# Patient Record
Sex: MALE | Race: WHITE | Employment: OTHER | URBAN - METROPOLITAN AREA
[De-identification: names, ages, dates, MRNs, and addresses within clinical notes are randomized per-mention and may not be internally consistent; named-entity substitution may affect disease eponyms.]

---

## 2019-09-13 ENCOUNTER — HOSPITAL ENCOUNTER (EMERGENCY)
Age: 34
Discharge: HOME | End: 2019-09-13
Payer: OTHER GOVERNMENT

## 2019-09-13 ENCOUNTER — APPOINTMENT (OUTPATIENT)
Dept: GENERAL RADIOLOGY | Age: 34
End: 2019-09-13
Payer: OTHER GOVERNMENT

## 2019-09-13 VITALS
OXYGEN SATURATION: 97 % | HEART RATE: 87 BPM | HEIGHT: 74 IN | DIASTOLIC BLOOD PRESSURE: 92 MMHG | WEIGHT: 225 LBS | BODY MASS INDEX: 28.88 KG/M2 | SYSTOLIC BLOOD PRESSURE: 139 MMHG | TEMPERATURE: 98.4 F | RESPIRATION RATE: 18 BRPM

## 2019-09-13 DIAGNOSIS — L03.011 CELLULITIS OF FINGER OF RIGHT HAND: Primary | ICD-10-CM

## 2019-09-13 PROCEDURE — 73140 X-RAY EXAM OF FINGER(S): CPT

## 2019-09-13 PROCEDURE — 99283 EMERGENCY DEPT VISIT LOW MDM: CPT

## 2019-09-13 RX ORDER — SULFAMETHOXAZOLE AND TRIMETHOPRIM 800; 160 MG/1; MG/1
1 TABLET ORAL 2 TIMES DAILY
Qty: 20 TABLET | Refills: 0 | Status: SHIPPED | OUTPATIENT
Start: 2019-09-13 | End: 2019-09-23

## 2019-09-13 RX ORDER — CEPHALEXIN 500 MG/1
500 CAPSULE ORAL 4 TIMES DAILY
Qty: 40 CAPSULE | Refills: 0 | Status: SHIPPED | OUTPATIENT
Start: 2019-09-13 | End: 2019-09-23

## 2019-09-17 NOTE — ED PROVIDER NOTES
This patient was not seen and evaluated by the attending physician. CHIEF COMPLAINT  Joint Swelling (patient reports \"dislocating\" his right hand ring finger x 4 weeks ago. 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 swelling in the right hand ring finger started swelling x 2 days ago and pt noticed a rash like streak going up right arm. says he also has some swelling in right arm pit ) and Rash      HISTORY OF PRESENT ILLNESS  Yisel Corey is a 34 y.o. male who presents to the ED for evaluation of right ring finger pain and a rash. Patient states that about a month ago he dislocated the pip joint on right ring finger. He states that it improved however he then developed some redness and swelling to the joint and now has a red line going from hand up right arm. No fevers. Patient states he is concerned about infection. There was no break in skin integrity with his dislocation. Here for further evaluation. No other complaints, modifying factors or associated symptoms. Nursing notes reviewed. Past Medical History:   Diagnosis Date    Asthma      No past surgical history on file. No family history on file.   Social History     Socioeconomic History    Marital status:      Spouse name: Not on file    Number of children: Not on file    Years of education: Not on file    Highest education level: Not on file   Occupational History    Not on file   Social Needs    Financial resource strain: Not on file    Food insecurity:     Worry: Not on file     Inability: Not on file    Transportation needs:     Medical: Not on file     Non-medical: Not on file   Tobacco Use    Smoking status: Former Smoker    Smokeless tobacco: Never Used   Substance and Sexual Activity    Alcohol use: No    Drug use: Not on file    Sexual activity: Not on file   Lifestyle    Physical activity:     Days per week: Not on file     Minutes per session: Not on file    Stress: Not on file   Relationships    Social

## 2020-02-06 ENCOUNTER — HOSPITAL ENCOUNTER (EMERGENCY)
Age: 35
Discharge: HOME | End: 2020-02-06
Payer: OTHER GOVERNMENT

## 2020-02-06 VITALS
DIASTOLIC BLOOD PRESSURE: 88 MMHG | HEIGHT: 74 IN | TEMPERATURE: 97.6 F | WEIGHT: 230 LBS | BODY MASS INDEX: 29.52 KG/M2 | OXYGEN SATURATION: 98 % | HEART RATE: 74 BPM | RESPIRATION RATE: 16 BRPM | SYSTOLIC BLOOD PRESSURE: 130 MMHG

## 2020-02-06 PROCEDURE — 65205 REMOVE FOREIGN BODY FROM EYE: CPT

## 2020-02-06 PROCEDURE — 99282 EMERGENCY DEPT VISIT SF MDM: CPT

## 2020-02-06 RX ORDER — PROPARACAINE HYDROCHLORIDE 5 MG/ML
SOLUTION/ DROPS OPHTHALMIC
Status: DISCONTINUED
Start: 2020-02-06 | End: 2020-02-06 | Stop reason: HOSPADM

## 2020-02-06 RX ORDER — SULFACETAMIDE SODIUM 100 MG/ML
SOLUTION/ DROPS OPHTHALMIC
Status: DISCONTINUED
Start: 2020-02-06 | End: 2020-02-06 | Stop reason: HOSPADM

## 2020-02-06 RX ORDER — IBUPROFEN 600 MG/1
600 TABLET ORAL EVERY 6 HOURS PRN
Qty: 120 TABLET | Refills: 0 | Status: SHIPPED | OUTPATIENT
Start: 2020-02-06

## 2020-02-06 RX ORDER — SULFACETAMIDE SODIUM 100 MG/ML
2 SOLUTION/ DROPS OPHTHALMIC
Qty: 1 BOTTLE | Refills: 0 | Status: SHIPPED | OUTPATIENT
Start: 2020-02-06 | End: 2020-02-11

## 2020-02-06 RX ORDER — BALANCED SALT SOLUTION ENRICHED WITH BICARBONATE, DEXTROSE, AND GLUTATHIONE
KIT INTRAOCULAR
Status: DISCONTINUED
Start: 2020-02-06 | End: 2020-02-06 | Stop reason: HOSPADM

## 2020-02-06 ASSESSMENT — PAIN SCALES - GENERAL: PAINLEVEL_OUTOF10: 5

## 2020-02-06 ASSESSMENT — ENCOUNTER SYMPTOMS
COUGH: 0
VOMITING: 0
EYE DISCHARGE: 0
CONSTIPATION: 0
EYE PAIN: 1
COLOR CHANGE: 0
EYE REDNESS: 1
NAUSEA: 0
DIARRHEA: 0
BACK PAIN: 0
EYE ITCHING: 0
SHORTNESS OF BREATH: 0
ABDOMINAL PAIN: 0
PHOTOPHOBIA: 0
RESPIRATORY NEGATIVE: 1

## 2020-02-06 ASSESSMENT — PAIN DESCRIPTION - LOCATION: LOCATION: EYE

## 2020-02-06 ASSESSMENT — PAIN DESCRIPTION - ORIENTATION: ORIENTATION: LEFT

## 2020-02-06 ASSESSMENT — PAIN DESCRIPTION - PAIN TYPE: TYPE: ACUTE PAIN

## 2020-02-06 ASSESSMENT — VISUAL ACUITY
OS: 20/15
OU: 1
OU: 20/15
OD: 20/15

## 2020-02-06 NOTE — ED PROVIDER NOTES
[facility]        Pt Name: Robert Kwan  MRN: [id number]  [place] 1985  Date of evaluation: 2/6/2020  Provider: TONY So  PCP: Maricarmen Ring MD    This patient was not seen and evaluated by the attending physician but available for consultation as needed. CHIEF COMPLAINT       Chief Complaint   Patient presents with    Foreign Body in Eye     pt states he rubbbed his left eye and feels like somethinig is in it       HISTORY OF PRESENT ILLNESS   (Location/Symptom, Timing/Onset, Context/Setting, Quality, Duration, Modifying Factors, Severity)  Note limiting factors. Robert Kwan is a 30 y.o. male with a past medical history of asthma who presents to the ED with complaint of a possible foreign body to his left eye. States he was at work. Patient states he rubbed his left eye and felt like he got something in it. Patient states he was initially seen in urgent care and states they stained his eye and sent to the ED for further evaluation and treatment. Patient states he was told they did not see anything. Patient says he has an aching irritation rated 5/10 to his left eye. Denies any problems with his right eye. Patient denies any erythema, drainage, headache, nausea/vomiting, visual changes, changes in visual fields, lightheadedness/dizziness or known injury/trauma. Patient denies history of previous injury or trauma to the eye. Patient states he does not wear glasses or contacts. Patient states he did have LASEK eye surgery in the past.  States tetanus up-to-date. Patient states he does feel like something is in his left eye at this time underneath his left eyelid. Nursing Notes were all reviewed and agreed with or any disagreements were addressed in the HPI.     REVIEW OF SYSTEMS    (2-9 systems for level 4, 10 or more for level 5)     Review of Systems   Constitutional: Negative for activity change, effort is normal. No respiratory distress. Breath sounds: No stridor. Musculoskeletal: Normal range of motion. Lymphadenopathy:      Cervical: No cervical adenopathy. Skin:     General: Skin is warm and dry. Coloration: Skin is not pale. Findings: No erythema. Neurological:      Mental Status: He is alert and oriented to person, place, and time. Psychiatric:         Behavior: Behavior normal.         DIAGNOSTIC RESULTS   LABS:    Labs Reviewed - No data to display    All other labs were within normal range or not returned as of this dictation. EKG: All EKG's are interpreted by the Emergency Department Physician in the absence of a cardiologist.  Please see their note for interpretation of EKG. RADIOLOGY:   Non-plain film images such as CT, Ultrasound and MRI are read by the radiologist. Plain radiographic images are visualized and preliminarily interpreted by the  ED Provider with the below findings:        Interpretation per the Radiologist below, if available at the time of this note:    No orders to display     No results found.         PROCEDURES   Unless otherwise noted below, none     Foreign Body Occular  Date/Time: 2/6/2020 11:54 AM  Performed by: TONY Herrera  Authorized by: TONY Herrera     Consent:     Consent obtained:  Verbal    Consent given by:  Patient    Risks discussed:  Bleeding, corneal damage, damage to surrounding structures, incomplete removal, globe perforation, infection, pain, visual impairment and worsening of condition    Alternatives discussed:  Referral and delayed treatment  Location:     Location:  L upper eyelid    Depth:  Superficial  Pre-procedure details:     Imaging:  None    OS visual acuity:  20/15    OD visual acuity:  20/15    Correction: uncorrected      Fluorescein exam: yes      Fluorescein uptake: yes      Cary test: negative      Corneal abrasion location:  Inferior  Anesthesia (see MAR for exact dosages):     Local anesthetic:  Proparacaine drops  Procedure details:     Localization method:  Direct visualization and eyelid eversion    Removal mechanism:  Moist cotton swab    Foreign bodies recovered:  1    Description:  Small speck    Intact foreign body removal: yes      Residual rust ring observed: no    Post-procedure details:     Confirmation:  No additional foreign bodies on visualization    Dressing:  Antibiotic drops    Patient tolerance of procedure: Tolerated well, no immediate complications        CRITICAL CARE TIME   N/A    CONSULTS:  None      EMERGENCY DEPARTMENT COURSE and DIFFERENTIAL DIAGNOSIS/MDM:   Vitals:    Vitals:    02/06/20 1015   BP: 130/88   Pulse: 74   Resp: 16   Temp: 97.6 °F (36.4 °C)   TempSrc: Oral   SpO2: 98%   Weight: 230 lb (104.3 kg)   Height: 6' 2\" (1.88 m)       Patient was given thefollowing medications:  Medications   sulfacetamide (BLEPH-10) 10 % ophthalmic solution (has no administration in time range)   balanced salts plus (BSS) ophthalmic solution (has no administration in time range)   fluorescein 1 MG ophthalmic strip (has no administration in time range)   proparacaine (ALCAINE) 0.5 % ophthalmic solution (has no administration in time range)       Patient is a 32-year-old male who presents to the ED with complaint of left eye foreign body. Has some pain to his left eye without conjunctival injection or drainage. EOMs intact. PERRLA. Lids unremarkable. No periorbital edema, ecchymosis, erythema or warmth noted. No temporal artery tenderness. Visual acuity 20/15 OD, OS and OU. Stain exam obtained after and anesthetizing the eye and showed what appeared to be a corneal abrasion around 5 o'clock position of the eye. Did sweep the upper eyelid and a small speck was found. Believe this may eventually be foreign body. No other foreign body noted upon visualization. Negative Cary sign and no evidence of penetrating globe injury. Believe can be discharged home.   Will give prescription for Bleph-10 eyedrops and Motrin. Patient given eyedrops here in the emergency department instructed on usage. Follow-up with his eye specialist or [facility] for further evaluation and treatment. Return ED for any worsening symptoms. Low suspicion for temporal arteritis, acute glaucoma, retinal detachment, vitreous detachment, vitreous hemorrhage, optic neuritis, arterial occlusion, venous occlusion, retro-orbital hematoma, orbital injury, penetrating globe injury, retained foreign body, ulcer, neuritis, uveitis, keratitis, conjunctivitis or other emergent etiology at this time. FINAL IMPRESSION      1. Abrasion of left cornea, initial encounter          DISPOSITION/PLAN   DISPOSITION Decision To Discharge 02/06/2020 11:41:08 AM      PATIENT REFERREDTO:  MD Karri Vallecillo [address] [phone]    Go to   As needed, If symptoms worsen    Your Eye Specialist    Schedule an appointment as soon as possible for a visit   For a Re-check in 2-3     days. [facility] [address] [id number]  [phone]    Schedule an appointment as soon as possible for a visit   For a Re-check in  2-3    days.       DISCHARGE MEDICATIONS:  Discharge Medication List as of 2/6/2020 11:45 AM      START taking these medications    Details   sulfacetamide (BLEPH-10) 10 % ophthalmic solution Place 2 drops into the left eye every 3 hours for 5 days, Disp-1 Bottle, R-0Print      ibuprofen (IBU) 600 MG tablet Take 1 tablet by mouth every 6 hours as needed for Pain, Disp-120 tablet, R-0Print             DISCONTINUED MEDICATIONS:  Discharge Medication List as of 2/6/2020 11:45 AM                 (Please note that portions of this note were completed with a voice recognition program.  Efforts were made to edit the dictations but occasionally words are mis-transcribed.)    TONY Theodore (electronically signed)          TONY Martin  02/06/20 5937

## 2020-02-06 NOTE — ED NOTES
Pt discharged in stable condition, VSS, no signs of distress. Discharge instructions and meds reviewed. Pt verbalizes understanding and states no further questions or concerns unaddressed.        Tessy Whitfield RN  02/06/20 3643

## 2020-03-05 ENCOUNTER — HOSPITAL ENCOUNTER (OUTPATIENT)
Dept: CT IMAGING | Age: 35
Discharge: HOME | End: 2020-03-05
Payer: OTHER GOVERNMENT

## 2020-03-05 PROCEDURE — 74176 CT ABD & PELVIS W/O CONTRAST: CPT

## 2020-07-06 ENCOUNTER — HOSPITAL ENCOUNTER (EMERGENCY)
Age: 35
Discharge: HOME | End: 2020-07-06
Payer: COMMERCIAL

## 2020-07-06 ENCOUNTER — APPOINTMENT (OUTPATIENT)
Dept: GENERAL RADIOLOGY | Age: 35
End: 2020-07-06
Payer: COMMERCIAL

## 2020-07-06 VITALS
SYSTOLIC BLOOD PRESSURE: 123 MMHG | HEART RATE: 74 BPM | HEIGHT: 74 IN | RESPIRATION RATE: 15 BRPM | BODY MASS INDEX: 28.88 KG/M2 | TEMPERATURE: 98 F | WEIGHT: 225 LBS | DIASTOLIC BLOOD PRESSURE: 88 MMHG | OXYGEN SATURATION: 96 %

## 2020-07-06 PROCEDURE — 99283 EMERGENCY DEPT VISIT LOW MDM: CPT

## 2020-07-06 PROCEDURE — 73610 X-RAY EXAM OF ANKLE: CPT

## 2020-07-06 ASSESSMENT — PAIN DESCRIPTION - PAIN TYPE: TYPE: ACUTE PAIN

## 2020-07-06 ASSESSMENT — PAIN DESCRIPTION - ORIENTATION: ORIENTATION: LEFT

## 2020-07-06 ASSESSMENT — PAIN SCALES - GENERAL: PAINLEVEL_OUTOF10: 6

## 2020-07-06 NOTE — ED NOTES
Applied a large tall walking boot to pt's left leg/foot. Pt instructed on use and tolerated well. Pt's CMS intact before and after application.       Rudi Celestin  07/06/20 2717

## 2020-07-06 NOTE — ED PROVIDER NOTES
[address] [facility]  ED  EMERGENCY DEPARTMENT ENCOUNTER        Pt Name: Charo Schwarz  MRN: [id number]  [place] 1985  Date of evaluation: 7/6/2020  Provider: DANYA Chairez - JIE  PCP: Edel Deutsch MD    Patient evaluated by FLOR. Supervising physician was available for consultation. CHIEF COMPLAINT       Chief Complaint   Patient presents with    Ankle Pain     started yesterday when falling in a potholes. Left side. HISTORY OF PRESENT ILLNESS   (Location, Timing/Onset, Context/Setting, Quality, Duration, Modifying Factors, Severity, Associated Signs and Symptoms)  Note limiting factors. Charo Schwarz is a 30 y.o. male who presents to the emergency department with 7-10 \"throbbing/sharp\" left ankle pain s/p \"rolled it\" yesterday. He was able to ambulate on incident and is weightbearing the emergency department today. Pain is exacerbated by weightbearing and alleviated with ice, elevation and rest.Tylenol has provided mild relief. Nursing Notes were all reviewed and agreed with or any disagreements were addressed in the HPI. REVIEW OF SYSTEMS    (2-9 systems for level 4, 10 or more for level 5)     Review of Systems   Constitutional: Negative. HENT: Negative. Eyes: Negative. Respiratory: Negative. Cardiovascular: Negative. Gastrointestinal: Negative. Genitourinary: Negative. Musculoskeletal: Positive for joint swelling.        + Lateral left malleolar edema and point tenderness. Neurological: Negative. Positives and Pertinent negatives as per HPI. Except as noted above in the ROS, all other systems were reviewed and negative. PAST MEDICAL HISTORY     Past Medical History:   Diagnosis Date    Asthma          SURGICAL HISTORY   History reviewed. No pertinent surgical history.       [address]       Discharge Medication List as of 7/6/2020 10:58 AM      CONTINUE these medications which have NOT CHANGED    Details ibuprofen (IBU) 600 MG tablet Take 1 tablet by mouth every 6 hours as needed for Pain, Disp-120 tablet, R-0Print      albuterol (PROVENTIL HFA) 108 (90 BASE) MCG/ACT inhaler Inhale 2 puffs into the lungs every 6 hours as needed for Wheezing or Shortness of Breath., Disp-1 Inhaler, R-0               ALLERGIES     Patient has no known allergies. FAMILYHISTORY     History reviewed. No pertinent family history. SOCIAL HISTORY       Social History     Tobacco Use    Smoking status: Former Smoker    Smokeless tobacco: Never Used   Substance Use Topics    Alcohol use: No    Drug use: Not on file       SCREENINGS             PHYSICAL EXAM    (up to 7 for level 4, 8 or more for level 5)     ED Triage Vitals [07/06/20 0926]   BP Temp Temp Source Pulse Resp SpO2 Height Weight   (!) 151/94 98 °F (36.7 °C) Oral 76 16 96 % 6' 2\" (1.88 m) 225 lb (102.1 kg)       Physical Exam  Constitutional:       Appearance: Normal appearance. HENT:      Head: Normocephalic and atraumatic. Right Ear: External ear normal.      Left Ear: External ear normal.      Nose: Nose normal.      Mouth/Throat:      Mouth: Mucous membranes are moist.   Neck:      Musculoskeletal: Normal range of motion. Cardiovascular:      Rate and Rhythm: Normal rate and regular rhythm. Pulmonary:      Effort: Pulmonary effort is normal.   Musculoskeletal:        Feet:    Neurological:      Mental Status: He is alert. DIAGNOSTIC RESULTS   LABS:    Labs Reviewed - No data to display    All other labs were within normal range or not returned as of this dictation. EKG: All EKG's are interpreted by the Emergency Department Physician in the absence of a cardiologist.  Please see their note for interpretation of EKG.       RADIOLOGY:   Non-plain film images such as CT, Ultrasound and MRI are read by the radiologist. Plain radiographic images are visualized and preliminarily interpreted by the ED Provider with the below

## 2020-07-10 ASSESSMENT — ENCOUNTER SYMPTOMS
EYES NEGATIVE: 1
RESPIRATORY NEGATIVE: 1
GASTROINTESTINAL NEGATIVE: 1

## 2021-04-01 ENCOUNTER — HOSPITAL ENCOUNTER (EMERGENCY)
Age: 36
Discharge: HOME | End: 2021-04-01

## 2021-04-01 VITALS
RESPIRATION RATE: 16 BRPM | SYSTOLIC BLOOD PRESSURE: 130 MMHG | DIASTOLIC BLOOD PRESSURE: 82 MMHG | BODY MASS INDEX: 28.23 KG/M2 | TEMPERATURE: 98.5 F | HEART RATE: 88 BPM | HEIGHT: 74 IN | WEIGHT: 220 LBS | OXYGEN SATURATION: 99 %

## 2021-04-01 DIAGNOSIS — M79.605 LEFT LEG PAIN: Primary | ICD-10-CM

## 2021-04-01 LAB
A/G RATIO: 1.6 (ref 1.1–2.2)
ALBUMIN SERPL-MCNC: 4.5 G/DL (ref 3.4–5)
ALP BLD-CCNC: 83 U/L (ref 40–129)
ALT SERPL-CCNC: 34 U/L (ref 10–40)
ANION GAP SERPL CALCULATED.3IONS-SCNC: 10 MMOL/L (ref 3–16)
APTT: 34.1 SEC (ref 24.2–36.2)
AST SERPL-CCNC: 28 U/L (ref 15–37)
BASOPHILS ABSOLUTE: 0 K/UL (ref 0–0.2)
BASOPHILS RELATIVE PERCENT: 0.2 %
BILIRUB SERPL-MCNC: 0.4 MG/DL (ref 0–1)
BUN BLDV-MCNC: 13 MG/DL (ref 7–20)
CALCIUM SERPL-MCNC: 9.5 MG/DL (ref 8.3–10.6)
CHLORIDE BLD-SCNC: 101 MMOL/L (ref 99–110)
CO2: 27 MMOL/L (ref 21–32)
CREAT SERPL-MCNC: 0.9 MG/DL (ref 0.9–1.3)
D DIMER: <200 NG/ML DDU (ref 0–229)
EOSINOPHILS ABSOLUTE: 0.2 K/UL (ref 0–0.6)
EOSINOPHILS RELATIVE PERCENT: 2 %
GFR AFRICAN AMERICAN: >60
GFR NON-AFRICAN AMERICAN: >60
GLOBULIN: 2.8 G/DL
GLUCOSE BLD-MCNC: 94 MG/DL (ref 70–99)
HCT VFR BLD CALC: 43.7 % (ref 40.5–52.5)
HEMOGLOBIN: 14.5 G/DL (ref 13.5–17.5)
INR BLD: 0.96 (ref 0.86–1.14)
LYMPHOCYTES ABSOLUTE: 1.9 K/UL (ref 1–5.1)
LYMPHOCYTES RELATIVE PERCENT: 23.7 %
MCH RBC QN AUTO: 27.2 PG (ref 26–34)
MCHC RBC AUTO-ENTMCNC: 33.1 G/DL (ref 31–36)
MCV RBC AUTO: 82.2 FL (ref 80–100)
MONOCYTES ABSOLUTE: 1.2 K/UL (ref 0–1.3)
MONOCYTES RELATIVE PERCENT: 15.5 %
NEUTROPHILS ABSOLUTE: 4.6 K/UL (ref 1.7–7.7)
NEUTROPHILS RELATIVE PERCENT: 58.6 %
PDW BLD-RTO: 12.6 % (ref 12.4–15.4)
PLATELET # BLD: 280 K/UL (ref 135–450)
PMV BLD AUTO: 7.6 FL (ref 5–10.5)
POTASSIUM SERPL-SCNC: 3.6 MMOL/L (ref 3.5–5.1)
PROTHROMBIN TIME: 11.1 SEC (ref 10–13.2)
RBC # BLD: 5.32 M/UL (ref 4.2–5.9)
SODIUM BLD-SCNC: 138 MMOL/L (ref 136–145)
TOTAL PROTEIN: 7.3 G/DL (ref 6.4–8.2)
WBC # BLD: 7.8 K/UL (ref 4–11)

## 2021-04-01 PROCEDURE — 85730 THROMBOPLASTIN TIME PARTIAL: CPT

## 2021-04-01 PROCEDURE — 85025 COMPLETE CBC W/AUTO DIFF WBC: CPT

## 2021-04-01 PROCEDURE — 99283 EMERGENCY DEPT VISIT LOW MDM: CPT

## 2021-04-01 PROCEDURE — 85610 PROTHROMBIN TIME: CPT

## 2021-04-01 PROCEDURE — 85379 FIBRIN DEGRADATION QUANT: CPT

## 2021-04-01 PROCEDURE — 6370000000 HC RX 637 (ALT 250 FOR IP): Performed by: PHYSICIAN ASSISTANT

## 2021-04-01 PROCEDURE — 80053 COMPREHEN METABOLIC PANEL: CPT

## 2021-04-01 PROCEDURE — 36415 COLL VENOUS BLD VENIPUNCTURE: CPT

## 2021-04-01 RX ORDER — ACETAMINOPHEN 500 MG
1000 TABLET ORAL ONCE
Status: COMPLETED | OUTPATIENT
Start: 2021-04-01 | End: 2021-04-01

## 2021-04-01 RX ADMIN — ACETAMINOPHEN 1000 MG: 500 TABLET, FILM COATED ORAL at 16:49

## 2021-04-01 ASSESSMENT — ENCOUNTER SYMPTOMS
RHINORRHEA: 0
DIARRHEA: 0
ABDOMINAL PAIN: 0
SHORTNESS OF BREATH: 0
NAUSEA: 0
VOMITING: 0
COUGH: 0

## 2021-04-01 NOTE — ED PROVIDER NOTES
[facility]        Pt Name: Manny Conway  MRN: [id number]  [place] 1985  Date of evaluation: 4/1/2021  Provider: Mike Parks PA-C  PCP: No primary care provider on file. FLOR. I have evaluated this patient. My supervising physician was available for consultation. CHIEF COMPLAINT       Chief Complaint   Patient presents with    Leg Pain     pt with bilateral leg pain x1 week, pt states left leg resolved, right leg with increased edema concerned for blood clot       HISTORY OF PRESENT ILLNESS   (Location, Timing/Onset, Context/Setting, Quality, Duration, Modifying Factors, Severity, Associated Signs and Symptoms)  Note limiting factors. Manny Conway is a 37 y.o. male presents to the emergency department today for evaluation for leg pain. The patient states that he was recently training at [employer], and he states that he was sleeping in the truck for several days last week, and he states that he did notice some swelling to both of his lower legs. The patient states that overall the right leg has improved however he states that he continues to have pain some swelling noted to his lower leg on the left, as well as concern for a possible blood clot. The patient states that he has some mild discomfort to his left calf, but he states that it is only worse with touch, certain movements. As he is sitting at rest he has no pain at this time. The patient denies falling or injuring himself in any way. The patient denies any numbness, or weakness. He still able to ambulate without any difficulty. The patient does not have any chest pain at this time, or shortness of breath. Patient has not had any fever or chills. He has no cough or congestion. He has no urinary symptoms. The patient denies any recent travels, immobilizations or surgeries. He has no history of DVT or PE. Patient otherwise has no complaints.       Nursing Notes were all reviewed and agreed with or any disagreements were addressed in the HPI. REVIEW OF SYSTEMS    (2-9 systems for level 4, 10 or more for level 5)     Review of Systems   Constitutional: Negative for activity change, appetite change, chills and fever. HENT: Negative for congestion and rhinorrhea. Respiratory: Negative for cough and shortness of breath. Cardiovascular: Positive for leg swelling. Negative for chest pain. Gastrointestinal: Negative for abdominal pain, diarrhea, nausea and vomiting. Genitourinary: Negative for difficulty urinating, dysuria and hematuria. Musculoskeletal: Positive for arthralgias. Neurological: Negative for weakness and numbness. Positives and Pertinent negatives as per HPI. Except as noted above in the ROS, all other systems were reviewed and negative. PAST MEDICAL HISTORY     Past Medical History:   Diagnosis Date    Asthma          SURGICAL HISTORY   History reviewed. No pertinent surgical history. [address]       Discharge Medication List as of 4/1/2021  6:23 PM      CONTINUE these medications which have NOT CHANGED    Details   ibuprofen (IBU) 600 MG tablet Take 1 tablet by mouth every 6 hours as needed for Pain, Disp-120 tablet, R-0Print      albuterol (PROVENTIL HFA) 108 (90 BASE) MCG/ACT inhaler Inhale 2 puffs into the lungs every 6 hours as needed for Wheezing or Shortness of Breath., Disp-1 Inhaler, R-0               ALLERGIES     Patient has no known allergies. FAMILYHISTORY     History reviewed. No pertinent family history.        SOCIAL HISTORY       Social History     Tobacco Use    Smoking status: Former Smoker    Smokeless tobacco: Never Used   Substance Use Topics    Alcohol use: No    Drug use: Not on file       SCREENINGS             PHYSICAL EXAM    (up to 7 for level 4, 8 or more for level 5)     ED Triage Vitals [04/01/21 1622]   BP Temp Temp Source Pulse Resp SpO2 Height Weight   (!) 195/102 98.3 °F (36.8 °C) Oral 94 18 100 % 6' 2\" (1.88 m) 220 lb (99.8 kg)       Physical Exam  Vitals signs and nursing note reviewed. Constitutional:       Appearance: He is well-developed. He is not diaphoretic. HENT:      Head: Normocephalic and atraumatic. Right Ear: External ear normal.      Left Ear: External ear normal.      Nose: Nose normal.   Eyes:      General:         Right eye: No discharge. Left eye: No discharge. Neck:      Musculoskeletal: Normal range of motion and neck supple. Trachea: No tracheal deviation. Cardiovascular:      Rate and Rhythm: Normal rate and regular rhythm. Pulses: Normal pulses. Heart sounds: No murmur. Pulmonary:      Effort: Pulmonary effort is normal. No respiratory distress. Breath sounds: Normal breath sounds. No wheezing or rales. Abdominal:      General: Bowel sounds are normal. There is no distension. Palpations: Abdomen is soft. Tenderness: There is no abdominal tenderness. There is no guarding. Musculoskeletal: Normal range of motion. Comments: There is mild tenderness to palpation to the left posterior calf when compared to the right, minimal edema noted to the left lower leg when compared to the right. Dorsalis pedis and posterior tibialis pulse are 2+. Normal sensation light touch per neurovascularly intact. Full range of motion of all joints. Is no erythema, ecchymosis or warmth. Normal gait. Skin:     General: Skin is warm and dry. Neurological:      Mental Status: He is alert and oriented to person, place, and time.    Psychiatric:         Behavior: Behavior normal.         DIAGNOSTIC RESULTS   LABS:    Labs Reviewed   CBC WITH AUTO DIFFERENTIAL    Narrative:     Performed at:  [facility]  [address], [address]   Phone [phone]   COMPREHENSIVE METABOLIC PANEL    Narrative:     Performed at:  [facility]  [address], [place]   Phone [phone]   APTT    Narrative:     Performed at:  [facility]  [address],  [place], [place]   Phone [phone]   PROTIME-INR    Narrative:     Performed at:  [facility]  [address],  [place], [place]   Phone [phone]   D-DIMER, QUANTITATIVE    Narrative:     Performed at:  [facility]  [address],  [place], [place]   Phone [phone]       All other labs were within normal range or not returned as of this dictation. EKG: All EKG's are interpreted by the Emergency Department Physician in the absence of a cardiologist.  Please see their note for interpretation of EKG. RADIOLOGY:   Non-plain film images such as CT, Ultrasound and MRI are read by the radiologist. Plain radiographic images are visualized and preliminarily interpreted by the ED Provider with the below findings:        Interpretation per the Radiologist below, if available at the time of this note:    VL Extremity Venous Left    (Results Pending)     No results found. PROCEDURES   Unless otherwise noted below, none     Procedures    CRITICAL CARE TIME   N/A    CONSULTS:  None      EMERGENCY DEPARTMENT COURSE and DIFFERENTIAL DIAGNOSIS/MDM:   Vitals:    Vitals:    04/01/21 1622 04/01/21 1816   BP: (!) 195/102 130/82   Pulse: 94 88   Resp: 18 16   Temp: 98.3 °F (36.8 °C) 98.5 °F (36.9 °C)   TempSrc: Oral Oral   SpO2: 100% 99%   Weight: 220 lb (99.8 kg)    Height: 6' 2\" (1.88 m)        Patient was given the following medications:  Medications   acetaminophen (TYLENOL) tablet 1,000 mg (1,000 mg Oral Given 4/1/21 5712)           Briefly, this is a 72-year-old male who presents to the emergency department today for evaluation for leg swelling. The patient denies falling or injuring herself in any way.   Patient tells me that he is recently training at [employer], and he was sleeping in a truck for couple of days, and he states that he did notice some leg edema, bilaterally, he states that it is completely resolved on the right but he continues to have some swelling, discomfort to his left calf and was concerned for a blood clot, no history of DVT or PE. On examination he does have tenderness palpation to the left posterior calf when compared to the right with minimal edema, he is neurovascularly intact, no bony tenderness    CBC shows no evidence of leukocytosis or anemia. CMP is unremarkable. Coags are unremarkable, his D-dimer is negative. Patient upon reevaluation, tells me that he was told at one time that he had a \"leaky valve\" to his left leg, and he feels that this could certainly be the cause of his symptoms. I did recommend compression stockings, the patient will be given a order for an ultrasound to be performed tomorrow, as his D-dimer is negative I do not feel that he needs any empiric treatment on any anticoagulants. The patient is to obtain follow-up with the [facility] within 2 to 3 days for reevaluation. He is to return to the ED for any new or worsening symptoms. The patient voiced understanding is agreeable with plan. Stable for discharge. My suspicion is low at this time for occult fracture, dislocation, subluxation, arterial occlusion, septic arthritis or other emergent etiology. FINAL IMPRESSION      1.  Left leg pain          DISPOSITION/PLAN   DISPOSITION Decision To Discharge 04/01/2021 06:24:27 PM      PATIENT REFERREDTO:  [facility] Emergency Department  [address] [place] [address]  [phone]    As needed, If symptoms worsen    [facility]) Pre-Services  [phone]  Schedule an appointment as soon as possible for a visit in 2 days        DISCHARGE MEDICATIONS:  Discharge Medication List as of 4/1/2021  6:23 PM          DISCONTINUED MEDICATIONS:  Discharge Medication List as of 4/1/2021  6:23 PM (Please note that portions of this note were completed with a voice recognition program.  Efforts were made to edit the dictations but occasionally words are mis-transcribed.)    Eduardo Villeda PA-C (electronically signed)            Eduardo Villeda PA-C  04/01/21 204

## 2021-04-16 ENCOUNTER — HOSPITAL ENCOUNTER (OUTPATIENT)
Dept: VASCULAR LAB | Age: 36
Discharge: HOME | End: 2021-04-16
Payer: OTHER GOVERNMENT

## 2021-04-16 DIAGNOSIS — M79.605 LEFT LEG PAIN: ICD-10-CM

## 2021-04-16 PROCEDURE — 93971 EXTREMITY STUDY: CPT

## 2024-01-12 ENCOUNTER — HOSPITAL ENCOUNTER (OUTPATIENT)
Dept: GENERAL RADIOLOGY | Age: 39
Discharge: HOME | End: 2024-01-12
Payer: OTHER GOVERNMENT

## 2024-01-12 ENCOUNTER — HOSPITAL ENCOUNTER (OUTPATIENT)
Age: 39
Discharge: HOME | End: 2024-01-12
Payer: OTHER GOVERNMENT

## 2024-01-12 DIAGNOSIS — M54.2 CERVICALGIA: ICD-10-CM

## 2024-01-12 PROCEDURE — 72040 X-RAY EXAM NECK SPINE 2-3 VW: CPT

## 2024-01-31 NOTE — PROGRESS NOTES
If the tinnitus becomes significantly bothersome he would be a candidate for tinnitus therapy with Dr. Palencia.  - Aida Chavis AuD., Audiology, [facility]    2. Normal hearing test  Audiogram reveals normal hearing across all frequencies.  He has borderline type a tympanograms bilaterally.  He has preserved word recognition scores as well.    Follow-up as needed.    Leroy Crenshaw DO  2/2/2024    Medical Decision Making:  The following items were considered in medical decision making:  Independent review of images  Review / order clinical lab tests  Review / order radiology tests  Decision to obtain old records

## 2024-02-01 NOTE — PROGRESS NOTES
Arnel Quintero   1985, 38 y.o. male   [id number]       Referring Provider: Leroy Crenshaw DO  Referral Type: In an order in Epic    Reason for Visit: Evaluation of the cause of disorders of hearing, tinnitus, or balance.    ADULT AUDIOLOGIC EVALUATION      Arnel Quintero is a 38 y.o. male seen today, 2/2/2024 , for an initial audiologic evaluation.  Patient was seen by Leroy Crenshaw DO following today's evaluation. Patient was alone.    AUDIOLOGIC AND OTHER PERTINENT MEDICAL HISTORY:      Arnel Quintero noted tinnitus and history of  noise exposure.  Patient reports intermittent bothersome bilateral tinnitus.  Patient has a history of  noise exposure for 17 years.    Arnel Quintero denied otalgia, aural fullness, dizziness, history of head trauma, history of ear surgery, and family history of hearing loss.    Date: 2/2/2024     IMPRESSIONS:      Normal middle ear pressure and compliance, bilaterally.  Normal hearing sensitivity with excellent word understanding at normal conversation level, bilaterally.  Follow medical recommendations of Leroy Crenshaw DO.     ASSESSMENT AND FINDINGS:     Otoscopy revealed: Clear ear canals bilaterally    RIGHT EAR:  Hearing Sensitivity:Hearing sensitivity within normal limits from 250-8000 Hz  Speech Recognition Threshold: 5 dB HL  Word Recognition:Excellent (90%), based on NU-6 Ordered-by-Difficulty 10-word list at 45 dBHL using recorded speech stimuli.    Tympanometry: Normal peak pressure with low compliance, Type As tympanogram, consistent with reduced tympanic membrane mobility.      LEFT EAR:  Hearing Sensitivity:Hearing sensitivity within normal limits from 250-8000 Hz  Speech Recognition Threshold: 5 dB HL  Word Recognition: Excellent (100%), based on NU-6 Ordered-by-Difficulty 10-word list at 45 dBHL using recorded speech stimuli.    Tympanometry: Normal peak pressure and compliance, Type A tympanogram, consistent with normal middle ear

## 2024-02-02 ENCOUNTER — PROCEDURE VISIT (OUTPATIENT)
Dept: AUDIOLOGY | Age: 39
End: 2024-02-02
Payer: OTHER GOVERNMENT

## 2024-02-02 ENCOUNTER — OFFICE VISIT (OUTPATIENT)
Dept: ENT CLINIC | Age: 39
End: 2024-02-02
Payer: OTHER GOVERNMENT

## 2024-02-02 VITALS
RESPIRATION RATE: 16 BRPM | SYSTOLIC BLOOD PRESSURE: 127 MMHG | HEIGHT: 74 IN | WEIGHT: 240 LBS | BODY MASS INDEX: 30.8 KG/M2 | DIASTOLIC BLOOD PRESSURE: 86 MMHG | HEART RATE: 78 BPM

## 2024-02-02 DIAGNOSIS — H93.13 TINNITUS OF BOTH EARS: Primary | ICD-10-CM

## 2024-02-02 DIAGNOSIS — Z01.10 ENCOUNTER FOR EXAM OF EARS AND HEARING W/O ABNORMAL FINDINGS: ICD-10-CM

## 2024-02-02 DIAGNOSIS — Z01.10 NORMAL HEARING TEST: ICD-10-CM

## 2024-02-02 PROCEDURE — 92567 TYMPANOMETRY: CPT | Performed by: AUDIOLOGIST

## 2024-02-02 PROCEDURE — 92557 COMPREHENSIVE HEARING TEST: CPT | Performed by: AUDIOLOGIST

## 2024-02-02 PROCEDURE — 99204 OFFICE O/P NEW MOD 45 MIN: CPT | Performed by: STUDENT IN AN ORGANIZED HEALTH CARE EDUCATION/TRAINING PROGRAM

## 2024-02-02 ASSESSMENT — ENCOUNTER SYMPTOMS
RHINORRHEA: 0
SHORTNESS OF BREATH: 0
EYE PAIN: 0
NAUSEA: 0
VOMITING: 0
COUGH: 0

## 2024-02-13 ENCOUNTER — TELEPHONE (OUTPATIENT)
Dept: ENT CLINIC | Age: 39
End: 2024-02-13

## 2024-02-13 NOTE — TELEPHONE ENCOUNTER
[email or web address]   Spoke to patient regarding tinnitus program. He states he has a lot of other appointments right now and may not have time.   He will review the packet and let us know if he would like to schedule.

## 2024-02-14 NOTE — PROGRESS NOTES
services described in this documentation, as scribed by Olivia Castaneda RN. in my presence. It is both accurate and complete to my knowledge.  I agree with the details independently gathered by the clinical support staff and the scribed note accurately describes my personal service to the patient.    Tobacco use, if applicable, was discussed with the patient and educated on the negative effects.    All questions and concerns were addressed to the patient/family. Alternatives to my treatment as well as risks and benefits of proposed treatment were discussed and understood by patient/family.     Thank you for allowing us to participate in the care of Arnel Quintero. Please call with questions.         Yaron Sy MD, FACC, [facility]  Interventional Cardiology  [facility]  2/16/2024   [phone]    Inadvertent computerized transcription errors may be present

## 2024-02-16 ENCOUNTER — OFFICE VISIT (OUTPATIENT)
Dept: CARDIOLOGY CLINIC | Age: 39
End: 2024-02-16
Payer: OTHER GOVERNMENT

## 2024-02-16 ENCOUNTER — TELEPHONE (OUTPATIENT)
Dept: CARDIOLOGY CLINIC | Age: 39
End: 2024-02-16

## 2024-02-16 VITALS
OXYGEN SATURATION: 98 % | WEIGHT: 244 LBS | HEART RATE: 80 BPM | DIASTOLIC BLOOD PRESSURE: 70 MMHG | SYSTOLIC BLOOD PRESSURE: 126 MMHG | HEIGHT: 74 IN | BODY MASS INDEX: 31.32 KG/M2

## 2024-02-16 DIAGNOSIS — Z76.89 ESTABLISHING CARE WITH NEW DOCTOR, ENCOUNTER FOR: Primary | ICD-10-CM

## 2024-02-16 DIAGNOSIS — R06.02 SOB (SHORTNESS OF BREATH): ICD-10-CM

## 2024-02-16 DIAGNOSIS — R00.2 PALPITATION: ICD-10-CM

## 2024-02-16 PROCEDURE — G8427 DOCREV CUR MEDS BY ELIG CLIN: HCPCS | Performed by: INTERNAL MEDICINE

## 2024-02-16 PROCEDURE — G8484 FLU IMMUNIZE NO ADMIN: HCPCS | Performed by: INTERNAL MEDICINE

## 2024-02-16 PROCEDURE — 1036F TOBACCO NON-USER: CPT | Performed by: INTERNAL MEDICINE

## 2024-02-16 PROCEDURE — 93000 ELECTROCARDIOGRAM COMPLETE: CPT | Performed by: INTERNAL MEDICINE

## 2024-02-16 PROCEDURE — 99204 OFFICE O/P NEW MOD 45 MIN: CPT | Performed by: INTERNAL MEDICINE

## 2024-02-16 PROCEDURE — G8417 CALC BMI ABV UP PARAM F/U: HCPCS | Performed by: INTERNAL MEDICINE

## 2024-02-16 RX ORDER — PREDNISONE 10 MG/1
TABLET ORAL
COMMUNITY
Start: 2024-02-09

## 2024-02-16 NOTE — PATIENT INSTRUCTIONS
Your provider has ordered testing for further evaluation.  An order/prescription has been included in your paper work.   To schedule outpatient testing, contact Central Scheduling by calling [employer] ([phone]).    PLAN:  Cardiac event monitor 2 weeks for palpitations  Stress test myoview to assess your heart for blockages   Call [phone] to schedule  No medications changes at this time  Follow up after testing

## 2024-02-16 NOTE — TELEPHONE ENCOUNTER
Monitor placed by Simran SCHUMACHER  Monitor company [employer]  Length of monitor 2 weeks  Monitor ordered by [facility]  Phone ID [id number]  First Patch ID [id number]  Activation successful prior to pt leaving office? Yes

## 2024-03-08 ENCOUNTER — HOSPITAL ENCOUNTER (OUTPATIENT)
Dept: MRI IMAGING | Age: 39
Discharge: HOME | End: 2024-03-08
Attending: STUDENT IN AN ORGANIZED HEALTH CARE EDUCATION/TRAINING PROGRAM
Payer: OTHER GOVERNMENT

## 2024-03-08 DIAGNOSIS — M54.6 PAIN IN THORACIC SPINE: ICD-10-CM

## 2024-03-08 PROCEDURE — 72146 MRI CHEST SPINE W/O DYE: CPT

## 2024-03-25 ENCOUNTER — HOSPITAL ENCOUNTER (OUTPATIENT)
Dept: MRI IMAGING | Age: 39
Discharge: HOME | End: 2024-03-25
Attending: STUDENT IN AN ORGANIZED HEALTH CARE EDUCATION/TRAINING PROGRAM
Payer: COMMERCIAL

## 2024-03-25 DIAGNOSIS — G62.9 NEUROPATHY: ICD-10-CM

## 2024-03-25 PROCEDURE — 72148 MRI LUMBAR SPINE W/O DYE: CPT

## 2024-03-29 DIAGNOSIS — R00.2 PALPITATION: ICD-10-CM

## 2024-03-29 DIAGNOSIS — R06.02 SOB (SHORTNESS OF BREATH): ICD-10-CM

## 2024-04-01 ENCOUNTER — TELEPHONE (OUTPATIENT)
Dept: CARDIOLOGY CLINIC | Age: 39
End: 2024-04-01

## 2024-04-01 NOTE — TELEPHONE ENCOUNTER
----- Message from Yaron Sy MD sent at 3/30/2024  8:02 PM EDT -----  Please let patient know that his cardiac monitor results are unremarkable and show very healthy heart rhythm function.  His palpitations are likely happening during the times his heart is beating fast with sinus tachycardia which is natural regular rhythm.  No concerns on the monitor whatsoever.

## 2024-04-01 NOTE — TELEPHONE ENCOUNTER
Pt returned call. Message given. Pt would like to know if he still needs Stress Test. Please advise.

## 2024-04-02 ENCOUNTER — HOSPITAL ENCOUNTER (OUTPATIENT)
Dept: CARDIOLOGY | Age: 39
Discharge: HOME | End: 2024-04-02
Attending: INTERNAL MEDICINE
Payer: COMMERCIAL

## 2024-04-02 ENCOUNTER — TELEPHONE (OUTPATIENT)
Dept: CARDIOLOGY CLINIC | Age: 39
End: 2024-04-02

## 2024-04-02 DIAGNOSIS — R06.02 SOB (SHORTNESS OF BREATH): ICD-10-CM

## 2024-04-02 DIAGNOSIS — R00.2 PALPITATION: ICD-10-CM

## 2024-04-02 PROCEDURE — 3430000000 HC RX DIAGNOSTIC RADIOPHARMACEUTICAL: Performed by: INTERNAL MEDICINE

## 2024-04-02 PROCEDURE — A9502 TC99M TETROFOSMIN: HCPCS | Performed by: INTERNAL MEDICINE

## 2024-04-02 PROCEDURE — 93017 CV STRESS TEST TRACING ONLY: CPT

## 2024-04-02 PROCEDURE — 78452 HT MUSCLE IMAGE SPECT MULT: CPT

## 2024-04-02 RX ADMIN — TETROFOSMIN 31.1 MILLICURIE: 1.38 INJECTION, POWDER, LYOPHILIZED, FOR SOLUTION INTRAVENOUS at 09:30

## 2024-04-02 RX ADMIN — TETROFOSMIN 11.2 MILLICURIE: 1.38 INJECTION, POWDER, LYOPHILIZED, FOR SOLUTION INTRAVENOUS at 08:00

## 2024-04-02 NOTE — TELEPHONE ENCOUNTER
----- Message from Yaron Sy MD sent at 4/2/2024  1:00 PM EDT -----  Please let patient know that his stress test results are unremarkable and show normal cardiac function.  This indicates very low risk of significant blockages in the heart vessels.  Overall, these findings, combined with his event monitor results are very reassuring and show that his symptoms are not likely related to any serious heart pathology.  Nothing further to do at this time.

## 2024-04-02 NOTE — PROGRESS NOTES
A [employer] stress test was completed on Pt. Pt tolerated test well, able to hit THR and recovered well. AVS / DC instructions given.